# Patient Record
Sex: FEMALE | Race: WHITE
[De-identification: names, ages, dates, MRNs, and addresses within clinical notes are randomized per-mention and may not be internally consistent; named-entity substitution may affect disease eponyms.]

---

## 2017-03-20 ENCOUNTER — HOSPITAL ENCOUNTER (EMERGENCY)
Dept: HOSPITAL 25 - UCCORT | Age: 57
Discharge: HOME | End: 2017-03-20
Payer: COMMERCIAL

## 2017-03-20 VITALS — DIASTOLIC BLOOD PRESSURE: 62 MMHG | SYSTOLIC BLOOD PRESSURE: 101 MMHG

## 2017-03-20 DIAGNOSIS — J06.9: Primary | ICD-10-CM

## 2017-03-20 DIAGNOSIS — Z88.2: ICD-10-CM

## 2017-03-20 DIAGNOSIS — H65.93: ICD-10-CM

## 2017-03-20 DIAGNOSIS — Z88.0: ICD-10-CM

## 2017-03-20 PROCEDURE — 99211 OFF/OP EST MAY X REQ PHY/QHP: CPT

## 2017-03-20 PROCEDURE — G0463 HOSPITAL OUTPT CLINIC VISIT: HCPCS

## 2017-03-20 NOTE — UC
Ear Complaint HPI





- HPI Summary


HPI Summary: 





Cough, ear pressure, nasal congestion and nasal drainage starting 5-6 days ago. 

Ear pressure is biggest concern. Denies fever, ear drainage, or trouble 

breathing. No hx of ENT surgery. 





- History of Current Complaint


Chief Complaint: UCEar


Stated Complaint: COUGH,BILATERAL EAR PAIN


Time Seen by Provider: 03/20/17 14:56


Hx Obtained From: Patient


Hx Last Menstrual Period: NONE


Pregnant?: No


Onset/Duration: Gradual Onset, Lasting Days


Severity Initially: Mild


Severity Currently: Moderate


Associated Signs/Symptoms: Positive: Hearing Loss, URI Symptoms





- Allergies/Home Medications


Allergies/Adverse Reactions: 


 Allergies











Allergy/AdvReac Type Severity Reaction Status Date / Time


 


Penicillins Allergy  Rash Verified 03/20/17 14:55


 


Sulfa Antibiotics Allergy  Rash Verified 03/20/17 14:55











Home Medications: 


 Home Medications





Acetaminophen-Phenylephrine 2 cap PO ONCE PRN 03/20/17 [History]











PMH/Surg Hx/FS Hx/Imm Hx


Endocrine History Of: 


   Denies: Diabetes


Cardiovascular History Of: 


   Denies: Hypertension, Pacemaker/ICD


GI/ History Of: 


   Denies: Renal Disease





- Surgical History


Surgical History: Yes


Surgery Procedure, Year, and Place: LUMPECTOMY - Rt BREAST.  APPENDECTOMY





- Family History


Known Family History: Positive: Hypertension





- Social History


Alcohol Use: Occasionally


Substance Use Type: None


Smoking Status (MU): Never Smoked Tobacco





Review of Systems


Constitutional: Negative


Skin: Negative


Eyes: Negative


ENT: Ear Ache, Nasal Discharge


Respiratory: Cough


Cardiovascular: Negative


Gastrointestinal: Negative


Genitourinary: Negative


Motor: Negative


Neurovascular: Negative


Musculoskeletal: Negative


Neurological: Negative


Psychological: Negative


All Other Systems Reviewed And Are Negative: Yes





Physical Exam


Triage Information Reviewed: Yes


Appearance: Well-Appearing, No Pain Distress, Well-Nourished


Vital Signs: 


 Initial Vital Signs











Temp  98.9 F   03/20/17 14:48


 


Pulse  79   03/20/17 14:48


 


Resp  14   03/20/17 14:48


 


BP  101/62   03/20/17 14:48


 


Pulse Ox  100   03/20/17 14:48











Vital Signs Reviewed: Yes


Eye Exam: Normal


Eyes: Positive: Conjunctiva Clear


ENT: Positive: Hearing grossly normal, Nasal congestion, Nasal drainage, TMs 

normal.  Negative: Tonsillar swelling, Tonsillar exudate


Dental Exam: Normal


Neck exam: Normal


Neck: Positive: Supple, Nontender, No Lymphadenopathy


Respiratory Exam: Normal


Respiratory: Positive: Chest non-tender, Lungs clear, Normal breath sounds, No 

respiratory distress, No accessory muscle use


Cardiovascular Exam: Normal


Cardiovascular: Positive: RRR, No Murmur


Musculoskeletal Exam: Normal


Neurological Exam: Normal


Psychological Exam: Normal


Skin Exam: Normal





Ear Complaint Course/Dx





- Differential Dx/Diagnosis


Provider Diagnoses: URI.  bilat ear serous otitis





Discharge





- Discharge Plan


Condition: Stable


Disposition: HOME


Patient Education Materials:  Upper Respiratory Infection (ED), Serous Otitis 

Media (ED)


Referrals: 


Rekha Kahn MD [Primary Care Provider] - If Needed

## 2018-02-26 ENCOUNTER — HOSPITAL ENCOUNTER (OUTPATIENT)
Dept: HOSPITAL 25 - OR | Age: 58
Discharge: HOME | End: 2018-02-26
Attending: ORTHOPAEDIC SURGERY
Payer: COMMERCIAL

## 2018-02-26 VITALS — DIASTOLIC BLOOD PRESSURE: 68 MMHG | SYSTOLIC BLOOD PRESSURE: 109 MMHG

## 2018-02-26 DIAGNOSIS — H02.401: ICD-10-CM

## 2018-02-26 DIAGNOSIS — G56.01: Primary | ICD-10-CM

## 2018-02-26 DIAGNOSIS — G40.909: ICD-10-CM

## 2018-02-26 DIAGNOSIS — E03.9: ICD-10-CM

## 2018-03-03 NOTE — OP
DATE OF OPERATION:  02/26/18 - [facility]

 

DATE OF BIRTH:  [birth year]

 

SURGEON:  Alec Garcia MD

 

ASSISTANT:  None.

 

ANESTHESIOLOGIST:  Dr. Young.

 

ANESTHESIA:  Local MAC.

 

PRE-OP DIAGNOSIS:  Right carpal tunnel syndrome.

 

POST-OP DIAGNOSIS:  Right carpal tunnel syndrome.

 

OPERATIVE PROCEDURE:  Right open carpal tunnel release.

 

INDICATIONS:  Carlota has had progressive disease.  We talked about risks and 
benefits, she wants to proceed with surgery.

 

ESTIMATED BLOOD LOSS:  2 mL.

 

COMPLICATIONS:  None.

 

FINDINGS:  As expected.

 

DESCRIPTION OF PROCEDURE:  Carlota was seen in the preoperative holding area.  We 
came back to the operating room.  The arm was prepped and draped in the usual 
fashion.  A time-out was performed.

 

The arm was exsanguinated with the Esmarch and the tourniquet was inflated to 
250 mmHg.  I had anesthetized the hand already with 0.25% plain Marcaine.  A 2 
to 3 cm incision was made longitudinally in the standard location for an open 
carpal tunnel release.  Dissection was carried down in the subcutaneous tissue 
and palmar fascia was released.  Transverse carpal ligament was released just 
off the radial aspect of the hook of the hamate.  The release was completed 
distally and proximally. Under direct visualization, the remainder of the 
transverse carpal ligament and distal antebrachial fascia was released with the 
tenotomy scissors, retracting the subcutaneous tissue volarly and ulnarly with 
Emilia retractor.  The wound was then irrigated out.  There was absolutely no 
compression on the nerve.  I checked thoroughly and there was very nice 
decompression. I then closed the skin with 4-0 nylon suture. Wound was dressed 
with Xeroform, 4x4, sterile Webril and an Ace bandage.  She was taken to the 
recovery room in stable condition.

 

 [id number]/[facility] #: [id number]

MTDD